# Patient Record
Sex: FEMALE | Race: WHITE | Employment: FULL TIME | URBAN - METROPOLITAN AREA
[De-identification: names, ages, dates, MRNs, and addresses within clinical notes are randomized per-mention and may not be internally consistent; named-entity substitution may affect disease eponyms.]

---

## 2022-06-16 ENCOUNTER — OFFICE VISIT (OUTPATIENT)
Dept: OBGYN CLINIC | Age: 27
End: 2022-06-16

## 2022-06-16 VITALS
BODY MASS INDEX: 34.52 KG/M2 | SYSTOLIC BLOOD PRESSURE: 142 MMHG | DIASTOLIC BLOOD PRESSURE: 80 MMHG | WEIGHT: 187.6 LBS | HEIGHT: 62 IN

## 2022-06-16 DIAGNOSIS — Z31.69 INFERTILITY COUNSELING: ICD-10-CM

## 2022-06-16 DIAGNOSIS — N92.6 IRREGULAR MENSES: Primary | ICD-10-CM

## 2022-06-16 PROCEDURE — 99203 OFFICE O/P NEW LOW 30 MIN: CPT | Performed by: OBSTETRICS & GYNECOLOGY

## 2022-06-16 ASSESSMENT — PATIENT HEALTH QUESTIONNAIRE - PHQ9
1. LITTLE INTEREST OR PLEASURE IN DOING THINGS: 0
SUM OF ALL RESPONSES TO PHQ9 QUESTIONS 1 & 2: 0
SUM OF ALL RESPONSES TO PHQ QUESTIONS 1-9: 0
2. FEELING DOWN, DEPRESSED OR HOPELESS: 0
SUM OF ALL RESPONSES TO PHQ QUESTIONS 1-9: 0

## 2022-06-16 NOTE — PROGRESS NOTES
Corry  is a 32 y.o. female, G0  Patient's last menstrual period was 2022., who is being seen as a new patient for irregular periods. She is wanting to get pregnant. She was told she has mild PCOS and known Hashimoto's (no meds). She was given femara- and states that she had a reaction (boils all over body) after 2nd month of taking it. She was being seen at [facility]. She is checking ovulation at home and states that she is ovulating. Her partner has not had a semen analysis, he does not have children. Was seen in ER yesterday and had a negative urine pregnancy test  Pelvic us with follicles seen but not increased ovarian volume. Oct 9th getting   Has been trying to conceive for 1 year. Her partner take blood pressure medication but otherwise is healthy. Her cycles are mostly back to once monthly. Her weight has fluctuated. She is trying to be healthier. HISTORY:    OB History        0    Para   0    Term   0       0    AB   0    Living   0       SAB   0    IAB   0    Ectopic   0    Molar   0    Multiple   0    Live Births   0              GYN History         Sexual History:   Social History     Substance and Sexual Activity   Sexual Activity Yes    Partners: Male          has a past medical history of Hashimoto's disease, Irregular periods, Kidney stones, and PCOS (polycystic ovarian syndrome). .    Past Surgical History:   Procedure Laterality Date    GALLBLADDER SURGERY        Had kidney stone and stent. Her current meds are:  pnv     Social:  [employer]. Exercise -walking 3 days per week. Ros negative except hpi       PHYSICAL EXAM:  BP (!) 142/80 (Site: Right Upper Arm, Position: Sitting)   Ht 5' 2\" (1.575 m)   Wt 187 lb 9.6 oz (85.1 kg)   LMP 2022   BMI 34.31 kg/m²   Physical Exam   General:  Well nourished well developed female in nad  Heart rrr  Lungs cta b&s  Abdomen soft nontender. No enlargement of liver. Extremities: no calf pain  Pelvic exam: deferred  Arms hair is dark. ASSESSMENT / PLAN:  [facility] was seen today for new patient. Diagnoses and all orders for this visit:    Irregular menses  -     TSH; Future  -     T4; Future  -     Thyroid Peroxidase Antibody; Future  -     Anti Mullerian Hormone; Future  -     Glucose, Random; Future  -     Insulin, Serum; Future  -     T3, Free; Future    Infertility counseling    semen kit given to pt. Reviewed her outside labs. rtc next week for fasting labs. May offered clomid if all looks good. Moderate level of decision making. conitinue to keep making healthy choices.         Livier Alt, DO

## 2022-07-06 ENCOUNTER — TELEPHONE (OUTPATIENT)
Dept: OBGYN CLINIC | Age: 27
End: 2022-07-06

## 2022-07-06 DIAGNOSIS — Z86.39 HISTORY OF HASHIMOTO THYROIDITIS: Primary | ICD-10-CM

## 2022-07-06 NOTE — TELEPHONE ENCOUNTER
----- Message from Marina Willoughby DO sent at 7/6/2022  2:44 PM EDT -----  Her thyroid functions are normal but her tpo antibodies are elevated. Hx of hashimotos. She likely should have an internist or endocrine referral for this. Her glucose to insulin ratio is 2.3.  she does have insulin resistance. Sometimes adding metformin is helpful with the ovulation medication. Metformin is for pcos   - main side effect is gi distress  / diarrhea. Ok to offer her ovulation meds after semen analysis.

## 2022-07-06 NOTE — TELEPHONE ENCOUNTER
Spoke with pt and advised her of results and recommendations. She would like referral to endocrinology. Referral placed. She is aware she should hear from someone within a few weeks regarding an appointment. She states that she has tried metformin in the past but per pt was started on such a high dose that she had GI side effects and could not tolerate this. She would like to know if she can start on a low dose at first to see how she tolerates this. Advised her I would sent to Dr. Juliana Brothers for recommendations. She uses [facility] in [place] at [facility]. All questions answered.     Orders Placed This Encounter   Procedures   Yuli Jose MD, Endocrinology, [place]     Referral Priority:   Routine     Referral Type:   Eval and Treat     Referral Reason:   Specialty Services Required     Referred to Provider:   Corazon Le MD     Requested Specialty:   Endocrinology     Number of Visits Requested:   1

## 2022-07-07 RX ORDER — METFORMIN HYDROCHLORIDE 500 MG/1
500 TABLET, EXTENDED RELEASE ORAL
Qty: 30 TABLET | Refills: 5 | Status: SHIPPED | OUTPATIENT
Start: 2022-07-07

## 2022-07-07 NOTE — TELEPHONE ENCOUNTER
Livier HART Alt, DO  You 2 hours ago (10:42 AM)     BA    I sent in metformin ER 500mg once daily to her [facility] with lunch or biggest meal      LVM for pt to return my call. Pt returned call and is aware of rx and recommendations. All questions answered and pt advised to call back PRN.

## 2022-07-19 ENCOUNTER — TELEPHONE (OUTPATIENT)
Dept: OBGYN CLINIC | Age: 27
End: 2022-07-19

## 2022-07-19 NOTE — TELEPHONE ENCOUNTER
Pt is aware of results and voiced full understanding. She states that she has started having spotting but it is not heavy like a typical period for her. She has taken 1 pregnancy test at home and it was negative. Advised pt to monitor bleeding for now, if this does not progress to a heavy period she should check another pregnancy test using her first morning urine. She voiced full understanding and is aware. All questions answered and pt advised to call back PRN.

## 2022-08-22 ENCOUNTER — TELEPHONE (OUTPATIENT)
Dept: OBGYN CLINIC | Age: 27
End: 2022-08-22

## 2022-08-22 NOTE — TELEPHONE ENCOUNTER
Livier Schulz, DO  Maddie REBA De Souza  Caller: Unspecified (Today,  9:10 AM)  I reached out to [facility]. She is 4-5 weeks correct. Can do an us next week(6+ weeks). I am not going to check thyroid (tsh and free t4) until new ob labs. Continue pnv and congratulations. Spoke with pt and advised her of Dr. Martín Abreu recommendations. She is scheduled for EOB ultrasound on 8/31/22 at 9 am. She is aware that we may need to repeat her ultrasound if she is not far enough along at that time. She is aware that we will recheck her thyroid labs with her prenatal labs at time of NOB talk per Dr. Papo Elliott. She voiced full understanding and is aware. All questions answered and pt advised to call back PRN.

## 2022-08-22 NOTE — TELEPHONE ENCOUNTER
Spoke with pt and she reports that she has received a letter from [facility] and has to send this back so that she can be seen as a new patient, we sent her referral in July 2022. She is taking a prenatal vitamin, pt advised to ensure that this has DHA and folic acid in it. She wants to know if she should be seen any sooner or if she needs management of her thyroid by Dr. Jeannie Beasley because she cannot see endocrinology until sometime in September. Advised her that I would send to Dr. Jeannie Beasley for recommendations. She voiced full understanding and is aware. All questions answered.

## 2022-08-31 ENCOUNTER — PROCEDURE VISIT (OUTPATIENT)
Dept: OBGYN CLINIC | Age: 27
End: 2022-08-31

## 2022-08-31 DIAGNOSIS — O36.80X0 ENCOUNTER TO DETERMINE FETAL VIABILITY OF PREGNANCY, SINGLE OR UNSPECIFIED FETUS: Primary | ICD-10-CM

## 2022-08-31 PROCEDURE — 76817 TRANSVAGINAL US OBSTETRIC: CPT | Performed by: OBSTETRICS & GYNECOLOGY

## 2022-09-12 ENCOUNTER — PROCEDURE VISIT (OUTPATIENT)
Dept: OBGYN CLINIC | Age: 27
End: 2022-09-12

## 2022-09-12 DIAGNOSIS — O36.80X0 ENCOUNTER TO DETERMINE FETAL VIABILITY OF PREGNANCY, SINGLE OR UNSPECIFIED FETUS: Primary | ICD-10-CM

## 2022-09-12 PROCEDURE — 76817 TRANSVAGINAL US OBSTETRIC: CPT | Performed by: OBSTETRICS & GYNECOLOGY

## 2022-09-15 ENCOUNTER — NURSE ONLY (OUTPATIENT)
Dept: OBGYN CLINIC | Age: 27
End: 2022-09-15

## 2022-09-15 ENCOUNTER — INITIAL PRENATAL (OUTPATIENT)
Dept: OBGYN CLINIC | Age: 27
End: 2022-09-15

## 2022-09-15 VITALS — BODY MASS INDEX: 33.68 KG/M2 | WEIGHT: 183 LBS | HEIGHT: 62 IN

## 2022-09-15 DIAGNOSIS — Z86.39 HISTORY OF HASHIMOTO THYROIDITIS: ICD-10-CM

## 2022-09-15 DIAGNOSIS — Z87.442 HISTORY OF KIDNEY STONES: ICD-10-CM

## 2022-09-15 DIAGNOSIS — Z34.01 ENCOUNTER FOR SUPERVISION OF NORMAL FIRST PREGNANCY IN FIRST TRIMESTER: ICD-10-CM

## 2022-09-15 DIAGNOSIS — Z34.01 ENCOUNTER FOR SUPERVISION OF NORMAL FIRST PREGNANCY IN FIRST TRIMESTER: Primary | ICD-10-CM

## 2022-09-15 DIAGNOSIS — E06.3 HASHIMOTO'S THYROIDITIS: ICD-10-CM

## 2022-09-15 DIAGNOSIS — E28.2 PCOS (POLYCYSTIC OVARIAN SYNDROME): ICD-10-CM

## 2022-09-15 NOTE — PROGRESS NOTES
Sobia Kunz, 25yo G1 is here today for NOB talk. Today we discussed scheduled appointments, scheduled ultrasounds, nutrition, appropriate weight gain, exercise, travel, genetic testing, hospital classes and registration, breastfeeding/lactation services, flu vaccine, Tdap, glucola, GBS, COVID 19, and Zika virus. She is undecided about genetic testing. She has a h/o PCOS, was on Metformin (she stopped this with her +PT), encouraged to start taking. She will need glucola at 18 and 28 weeks. H/o hashimoto's, will need TSH and Free T4 q trimester. She has a h/o kidney stones, with h/o stent placed. She is encouraged to start taking asa 81mg and vitamin D 2000iu at 12 weeks, d/c asa at 36 weeks- to help aid in prevention of high blood pressure in pregnancy. She is asked to return to the office in 5 weeks for NOB exam. All questions answered. Pt is encouraged to call the office with any questions or concerns. Orders Placed This Encounter   Procedures    Culture, Urine     Standing Status:   Future     Number of Occurrences:   1     Standing Expiration Date:   9/15/2023     Order Specific Question:   Specify (ex-cath, midstream, cysto, etc)?      Answer:   Urine, clean catch    CBC with Auto Differential     Standing Status:   Future     Number of Occurrences:   1     Standing Expiration Date:   9/15/2023    RPR     Standing Status:   Future     Number of Occurrences:   1     Standing Expiration Date:   9/15/2023    Hepatitis B Surface Antigen     Standing Status:   Future     Number of Occurrences:   1     Standing Expiration Date:   9/15/2023    Rubella antibody, IgG     Standing Status:   Future     Number of Occurrences:   1     Standing Expiration Date:   9/15/2023    HIV 1/2 Ag/Ab, 4TH Generation,W Rflx Confirm     Standing Status:   Future     Number of Occurrences:   1     Standing Expiration Date:   9/15/2023    Hepatitis C Antibody     Standing Status:   Future     Number of Occurrences:   1     Standing Expiration Date:   9/15/2023    TSH     Standing Status:   Future     Number of Occurrences:   1     Standing Expiration Date:   9/15/2023    T4, Free     Standing Status:   Future     Number of Occurrences:   1     Standing Expiration Date:   9/15/2023    Varicella Zoster Antibody, IgG     Standing Status:   Future     Number of Occurrences:   1     Standing Expiration Date:   9/15/2023    Hemoglobin A1C     Standing Status:   Future     Number of Occurrences:   1     Standing Expiration Date:   9/15/2023    ABO/Rh     Standing Status:   Future     Number of Occurrences:   1     Standing Expiration Date:   9/15/2023    Antibody Screen     Standing Status:   Future     Number of Occurrences:   1     Standing Expiration Date:   9/15/2023

## 2022-09-22 ENCOUNTER — NURSE ONLY (OUTPATIENT)
Dept: OBGYN CLINIC | Age: 27
End: 2022-09-22

## 2022-09-22 DIAGNOSIS — Z3A.08 8 WEEKS GESTATION OF PREGNANCY: Primary | ICD-10-CM

## 2022-09-22 LAB
ABO + RH BLD: NORMAL
BASOPHILS # BLD: 0 K/UL (ref 0–0.2)
BASOPHILS NFR BLD: 0 % (ref 0–2)
BLOOD GROUP ANTIBODIES SERPL: NORMAL
DIFFERENTIAL METHOD BLD: ABNORMAL
EOSINOPHIL # BLD: 0 K/UL (ref 0–0.8)
EOSINOPHIL NFR BLD: 0 % (ref 0.5–7.8)
ERYTHROCYTE [DISTWIDTH] IN BLOOD BY AUTOMATED COUNT: 14 % (ref 11.9–14.6)
EST. AVERAGE GLUCOSE BLD GHB EST-MCNC: 94 MG/DL
HBA1C MFR BLD: 4.9 % (ref 4.8–5.6)
HCT VFR BLD AUTO: 39.9 % (ref 35.8–46.3)
HGB BLD-MCNC: 12.9 G/DL (ref 11.7–15.4)
IMM GRANULOCYTES # BLD AUTO: 0 K/UL (ref 0–0.5)
IMM GRANULOCYTES NFR BLD AUTO: 0 % (ref 0–5)
LYMPHOCYTES # BLD: 2.1 K/UL (ref 0.5–4.6)
LYMPHOCYTES NFR BLD: 20 % (ref 13–44)
MCH RBC QN AUTO: 30.1 PG (ref 26.1–32.9)
MCHC RBC AUTO-ENTMCNC: 32.3 G/DL (ref 31.4–35)
MCV RBC AUTO: 93 FL (ref 79.6–97.8)
MONOCYTES # BLD: 0.4 K/UL (ref 0.1–1.3)
MONOCYTES NFR BLD: 3 % (ref 4–12)
NEUTS SEG # BLD: 8.1 K/UL (ref 1.7–8.2)
NEUTS SEG NFR BLD: 77 % (ref 43–78)
NRBC # BLD: 0 K/UL (ref 0–0.2)
PLATELET # BLD AUTO: 357 K/UL (ref 150–450)
PMV BLD AUTO: 10 FL (ref 9.4–12.3)
RBC # BLD AUTO: 4.29 M/UL (ref 4.05–5.2)
WBC # BLD AUTO: 10.7 K/UL (ref 4.3–11.1)

## 2022-09-23 LAB
HBV SURFACE AG SER QL: NONREACTIVE
HCV AB SER QL: NONREACTIVE
HIV 1+2 AB+HIV1 P24 AG SERPL QL IA: NONREACTIVE
HIV 1/2 RESULT COMMENT: NORMAL
RPR SER QL: NONREACTIVE
RUBV IGG SERPL IA-ACNC: 44.9 IU/ML (ref 0–50)
T4 FREE SERPL-MCNC: 1 NG/DL (ref 0.78–1.46)
TSH, 3RD GENERATION: 2.12 UIU/ML (ref 0.36–3.74)

## 2022-09-24 LAB — VZV IGG SER IA-ACNC: 245 INDEX

## 2022-09-25 LAB
BACTERIA SPEC CULT: NORMAL
BACTERIA SPEC CULT: NORMAL
SERVICE CMNT-IMP: NORMAL

## 2022-09-29 ENCOUNTER — TELEPHONE (OUTPATIENT)
Dept: OBGYN CLINIC | Age: 27
End: 2022-09-29

## 2022-09-29 NOTE — TELEPHONE ENCOUNTER
Spoke with pt and she reports that she is congested, has a headache, and is seeing blood when blowing her nose. She states that she ran a fever of 100 degrees last night but within 1 hour this was resolved. She is advised that she needs to take a test for COVID. If positive, she is advised to let us know. She is advised that she may take mucinex and use a saline nasal spray PRN. All questions answered and pt advised to call back PRN.

## 2022-10-20 ENCOUNTER — ROUTINE PRENATAL (OUTPATIENT)
Dept: OBGYN CLINIC | Age: 27
End: 2022-10-20

## 2022-10-20 VITALS
DIASTOLIC BLOOD PRESSURE: 74 MMHG | SYSTOLIC BLOOD PRESSURE: 118 MMHG | WEIGHT: 182.2 LBS | BODY MASS INDEX: 33.53 KG/M2 | HEIGHT: 62 IN

## 2022-10-20 DIAGNOSIS — Z13.89 SCREENING FOR GENITOURINARY CONDITION: ICD-10-CM

## 2022-10-20 DIAGNOSIS — Z3A.12 12 WEEKS GESTATION OF PREGNANCY: ICD-10-CM

## 2022-10-20 DIAGNOSIS — Z11.3 SCREENING EXAMINATION FOR VENEREAL DISEASE: ICD-10-CM

## 2022-10-20 DIAGNOSIS — Z34.01 ENCOUNTER FOR SUPERVISION OF NORMAL FIRST PREGNANCY IN FIRST TRIMESTER: Primary | ICD-10-CM

## 2022-10-20 LAB
GLUCOSE URINE, POC: NEGATIVE
PROTEIN,URINE, POC: NORMAL

## 2022-10-20 PROCEDURE — 99902 PR PRENATAL VISIT: CPT | Performed by: OBSTETRICS & GYNECOLOGY

## 2022-10-20 PROCEDURE — 81002 URINALYSIS NONAUTO W/O SCOPE: CPT | Performed by: OBSTETRICS & GYNECOLOGY

## 2022-10-20 NOTE — PROGRESS NOTES
Discussed glucola, wt,diet,U/S,exercise. All ?s answered, wants NIPT  Will stay on the metformin , has PCOS.  early glucola 18 weeks  Works in [employer], so had discussion about carbs

## 2022-10-25 LAB
C TRACH RRNA SPEC QL NAA+PROBE: NEGATIVE
N GONORRHOEA RRNA SPEC QL NAA+PROBE: NEGATIVE
SPECIMEN SOURCE: NORMAL
T VAGINALIS RRNA SPEC QL NAA+PROBE: NEGATIVE